# Patient Record
Sex: MALE | Race: BLACK OR AFRICAN AMERICAN
[De-identification: names, ages, dates, MRNs, and addresses within clinical notes are randomized per-mention and may not be internally consistent; named-entity substitution may affect disease eponyms.]

---

## 2018-04-29 ENCOUNTER — HOSPITAL ENCOUNTER (EMERGENCY)
Dept: HOSPITAL 15 - ER | Age: 58
Discharge: LEFT BEFORE DISCHARGE | End: 2018-04-29
Payer: COMMERCIAL

## 2018-04-29 VITALS — HEIGHT: 68 IN | WEIGHT: 150 LBS | BODY MASS INDEX: 22.73 KG/M2

## 2018-04-29 VITALS — SYSTOLIC BLOOD PRESSURE: 147 MMHG | DIASTOLIC BLOOD PRESSURE: 102 MMHG

## 2018-04-29 DIAGNOSIS — Z53.21: ICD-10-CM

## 2018-04-29 DIAGNOSIS — I10: Primary | ICD-10-CM

## 2018-04-29 PROCEDURE — 93005 ELECTROCARDIOGRAM TRACING: CPT

## 2025-02-05 ENCOUNTER — HOSPITAL ENCOUNTER (EMERGENCY)
Dept: HOSPITAL 15 - ER | Age: 65
Discharge: HOME | End: 2025-02-05
Payer: COMMERCIAL

## 2025-02-05 VITALS
DIASTOLIC BLOOD PRESSURE: 88 MMHG | RESPIRATION RATE: 18 BRPM | HEART RATE: 96 BPM | SYSTOLIC BLOOD PRESSURE: 136 MMHG | OXYGEN SATURATION: 100 %

## 2025-02-05 VITALS — HEIGHT: 68 IN | BODY MASS INDEX: 22.35 KG/M2 | WEIGHT: 147.49 LBS

## 2025-02-05 VITALS — TEMPERATURE: 98.4 F

## 2025-02-05 DIAGNOSIS — I16.1: Primary | ICD-10-CM

## 2025-02-05 DIAGNOSIS — Z88.8: ICD-10-CM

## 2025-02-05 LAB
ANION GAP SERPL CALCULATED.3IONS-SCNC: 6 MMOL/L (ref 5–15)
BUN SERPL-MCNC: 11 MG/DL (ref 9–23)
BUN/CREAT SERPL: 9.2 (ref 10–20)
CALCIUM SERPL-MCNC: 9.7 MG/DL (ref 8.7–10.4)
CHLORIDE SERPL-SCNC: 104 MMOL/L (ref 98–107)
CO2 SERPL-SCNC: 29 MMOL/L (ref 20–31)
GLUCOSE SERPL-MCNC: 96 MG/DL (ref 74–106)
HCT VFR BLD AUTO: 46.5 % (ref 41–53)
HGB BLD-MCNC: 15.6 G/DL (ref 13.5–17.5)
MCH RBC QN AUTO: 28.9 PG (ref 28–32)
MCV RBC AUTO: 86 FL (ref 80–100)
NRBC BLD QL AUTO: 0.1 %
POTASSIUM SERPL-SCNC: 4.4 MMOL/L (ref 3.5–5.1)
SODIUM SERPL-SCNC: 139 MMOL/L (ref 136–145)

## 2025-02-05 PROCEDURE — 36415 COLL VENOUS BLD VENIPUNCTURE: CPT

## 2025-02-05 PROCEDURE — 85025 COMPLETE CBC W/AUTO DIFF WBC: CPT

## 2025-02-05 PROCEDURE — 80048 BASIC METABOLIC PNL TOTAL CA: CPT

## 2025-02-05 NOTE — ED.PDOC
HPI Comments


64 y/o M, with PMHX of HTN prsents to the ED for CC of hypertension. Patient 

states, that he has been experiencing abnormally high blood pressure readings 

xdays. Patient comments on, easing himself off hypertension medication; non-

complaint with hypertension medication due to readings normalizing. Patient 

endorse on, eating foods high in sodium which he believes can be a result to his

symptoms but is unsure. Patient denies fever, headache, dizziness, chest pain, 

palpitations, or SOB.  No other symptoms or modifying factors at this time.


Time Seen by MD:  13:00


Primary Care Provider:  NICOLEK


Reviewed Notes:  Nurses Notes, Medications, Allergies


Allergies:  


Coded Allergies:  


     Lisinopril (Verified  Allergy, Unknown, ITCHING, 8/27/16)


Information Source:  Patient


Mode of Arrival:  Ambulatory


Severity:  Moderate


Timing:  Days


Duration:  Since onset


Prehospital treatment:  None


Onset:  At Rest


Cardiac Risk Factors:  HTN


PE Risk Factors:  None


History of:  None


Associated Signs and Symptoms:  None





Past Medical History


PAST MEDICAL HISTORY:  Cancer, HTN


Surgical History:  Denies all surgeries





Family History


Family History:  Unknown





Social History


Smoker:  Non-Smoker


Alcohol:  Rarely


Drugs:  Denies Drug Use


Lives In:  Home





Constitutional:  denies: chills, diaphoresis, fatigue, fever, malaise, sweats, 

weakness, others


EENTM:  denies: blurred vision, double vision, ear bleeding, ear discharge, ear 

drainage, ear pain, ear ringing, eye pain, eye redness, hearing loss, mouth 

pain, mouth swelling, nasal discharge, nose bleeding, nose congestion, nose 

pain, photophobia, tearing, throat pain, throat swelling, voice changes, others


Respiratory:  denies: cough, hemoptysis, orthopnea, SOB at rest, shortness of 

breath, SOB with excertion, stridor, wheezing, others


Cardiovascular:  denies: chest pain, dizzy spells, diaphoresis, Dyspnea on 

exertion, edema, irregular heart beat, left arm pain, lightheadedness, 

palpitations, PND, syncope, others


Gastrointestinal:  denies: abdomen distended, abdominal pain, blood streaked 

bowels, constipated, diarrhea, dysphagia, difficulty swallowing, hematemesis, 

melena, nausea, poor appetite, poor fluid intake, rectal bleeding, rectal pain, 

vomiting, others


Genitourinary:  denies: burning, dysuria, flank pain, frequency, hematuria, 

incontinence, penile discharge, penile sore, pain, testicle pain, testicle 

swelling, urgency, others


Neurological:  reports: headache (burning); denies: dizziness, fainting, left 

sided numbness, left sided weakness, numbness, paresthesia, pre-existing 

deficit, right sided numbness, right sided weakness, seizure, speech problems, 

tingling, tremors, weakness, others


Musculoskeletal:  denies: back pain, gout, joint pain, joint swelling, muscle p

ain, muscle stiffness, neck pain, others


Integumetry:  denies: bruises, change in color, change in hair/nails, dryness, 

laceration, lesions, lumps, rash, wounds, others


Allergic/Immunocompromised:  denies: Difficulty Healing, Frequent Infections, 

Hives, Itching, others


Hematologic/Lymphatic:  denies: anemia, blood clots, easy bleeding, easy 

bruising, swollen glands, others


Endocrine:  denies: excessive hunger, excessive sweating, excessive thirst, 

excessive urination, flushing, intolerance to cold, intolerance to heat, 

unexplained weight gain, unexplained weight loss, others


Psychiatric:  denies: anxiety, bipolar disorder, depression, hopeless, panic 

disorder, schizophrenia, sleepless, suicidal, others


All Other Systems:  Reviewed and Negative





Physical Exam


General Appearance:  Moderate Distress


HEENT:  Normal ENT Inspection, Pharynx Normal, TMs Normal


Neck:  Full Range of Motion, Non-Tender, Normal, Normal Inspection


Respiratory:  Chest Non-Tender, Lungs Clear, No Accessory Muscle Use, No 

Respiratory Distress, Normal Breath Sounds


Cardiovascular:  No Edema, No JVD, No Murmur, No Gallop, Normal Peripheral 

Pulses, Regular Rate/Rhythm


Breast Exam:  Deferred


Gastrointestinal:  No Organomegaly, Non Tender, No Pulsatile Mass, Normal Bowel 

Sounds, Soft


Genitalia:  Deferred


Pelvic:  Deferred


Rectal:  Deferred


Extremities:  No calf tenderness, Normal capillary refill, Normal inspection, 

Normal range of motion, Non-tender, No pedal edema


Musculoskeletal :  


   Apperance:  Normal


Neurologic:  Alert, CNs II-XII nml as Tested, No Motor Deficits, Normal Affect, 

Normal Mood, No Sensory Deficits


Cerebellar Function:  Normal


Reflexes:  Normal


Skin:  Dry, Normal Color, Warm


Peripheral Pulses:  3+ Radial (R), 3+ Radial (L)


Lymphatic:  No Adenopathy





Was a procedure done?


Was a procedure done?:  No





CP Differential Dx


Differential Diagnosis:  A-fib, A-Flutter, Angina, Anxiety / Panic Attack, 

Atrial Dysrhythmia, Electrolyte Disorder, Other (hypertensive crisis)





X-Ray, Labs, Meds, VS





                                   Vital Signs








  Date Time  Temp Pulse Resp B/P (MAP) Pulse Ox O2 Delivery O2 Flow Rate FiO2


 


2/5/25 16:33    180/102    


 


2/5/25 16:13 98.4 98 16 180/102 (128) 99   





 98.4       


 


2/5/25 13:45    179/100    


 


2/5/25 13:10 96.9 71 16 183/101 (128) 97   





    161/109 (126)    








                                       Lab








Test


 2/5/25


13:25 Range/Units


 


 


White Blood Count


 5.6 


 4.4-10.8


10^3/uL


 


Red Blood Count


 5.40 


 4.5-5.90


10^6/uL


 


Hemoglobin 15.6  13.5-17.5  g/dL


 


Hematocrit 46.5  41.0-53.0  %


 


Mean Corpuscular Volume 86.0  80.0-100.0  fL


 


Mean Corpuscular Hemoglobin 28.9  28.0-32.0  pg


 


Mean Corpuscular Hemoglobin


Concent 33.6 


 32.0-36.0  g/dL





 


Red Cell Distribution Width 13.7  11.8-14.3  %


 


Platelet Count


 282 


 140-450


10^3/uL


 


Mean Platelet Volume 7.8  6.9-10.8  fL


 


Neutrophils (%) (Auto) 65.6  37.0-80.0  %


 


Lymphocytes (%) (Auto) 20.6  10.0-50.0  %


 


Monocytes (%) (Auto) 9.6  0.0-12.0  %


 


Eosinophils (%) (Auto) 3.4  0.0-7.0  %


 


Basophils (%) (Auto) 0.8  0.0-2.0  %


 


Neutrophils # (Auto)


 3.6 


 1.6-8.6  10


^3/uL


 


Lymphocytes # (Auto)


 1.1 


 0.4-5.4  10


^3/uL


 


Monocytes # (Auto) 0.5  0-1.3  10 ^3/uL


 


Eosinophils # (Auto) 0.2  0-0.8  10 ^3/uL


 


Basophils # (Auto) 0  0-0.2  10 ^3/uL


 


Nucleated Red Blood Cells 0.1   %


 


Sodium Level 139  136-145  mmol/L


 


Potassium Level 4.4  3.5-5.1  mmol/L


 


Chloride Level 104    mmol/L


 


Carbon Dioxide Level 29  20-31  mmol/L


 


Anion Gap 6  5-15  


 


Blood Urea Nitrogen 11  9-23  mg/dL


 


Creatinine


 1.20 


 0.700-1.30


mg/dL


 


Glomerular Filtration Rate


Calc 66 


 >90  mL/min





 


BUN/Creatinine Ratio 9.2 L 10.0-20.0  


 


Serum Glucose 96    mg/dL


 


Calcium Level 9.7  8.7-10.4  mg/dL








                               Current Medications








 Medications


  (Trade)  Dose


 Ordered  Sig/Renzo


 Route  Start Time


 Stop Time Status Last Admin


 


 Amlodipine


 Besylate


  (Norvasc Tablet)  10 mg  ONCE  ONCE


 PO  2/5/25 13:15


 2/5/25 13:16 DC 2/5/25 13:45





 


 Clonidine HCl


  (Catapres Tablet)  0.2 mg  ONCE  ONCE


 PO  2/5/25 16:15


 2/5/25 16:16 DC 2/5/25 16:33








Patient alert.  


States that his blood pressure has been high.


Saturation pristine on room air.


Answering all questions.


Denies headache.


Denies dizziness. 


Was given Norvasc.  


Explained to the patient.


Blood pressure better after giving Norvasc clonidine.  


Patient insists on going home.  


WBC within normal limits.  


Hemoglobin within normal limits.  


Explained to the patient that he will need to take his medication.  


Was told to follow up with his primary care physician.  


Was told to come back if there is any problem.


Time of 1ST Reevaluation:  13:30


Reevaluation 1ST:  Improved


Time of 2ND Reevaluation:  16:38


Reevaluation 2ND:  Improved


Patient Education/Counseling:  Diagnosis, Treatment


Family Education/Counseling:  Diagnosis, Treatment


Additional Information


I reviewed the following notes from patient's past medical encounters: 04/29/25 

DX: HYPERTENSION





The following tests were ordered, and results were reviewed by me: CBC, BMP





Additional Information was gathered from interviewing the following independent 

historians: FAMILY





I discussed treatment and results with medical personnel and: PATIENT AND FAMILY





Departure 1


Departure


Time of Disposition:  13:25


Impression:  


   Primary Impression:  


   Hypertensive emergency


Disposition:  01 HOME / SELF CARE / HOMELESS


Condition:  Good


Discharged With:  Self





Critical Care Note


Critical Care Time?:  No





Stability


Stability form required:  No





Heart Score


Heart Score:  








Heart Score Response (Comments) Value


 


History N/A 0


 


EKG N/A 0


 


Age N/A 0


 


Risk Factors N/A 0


 


Troponin N/A 0


 


Total  0














I personally scribed for NIKO MEYER MD ([email or web address]) on 2/5/25 at 13:18.  

Electronically submitted by Emily Reyes ([email or web address]).


I personally scribed for NIKO MEYER MD ([email or web address]) on 2/5/25 at 14:57.  

Electronically submitted by Emily Reyes ([email or web address]).





NIKO MEYER MD            Feb 5, 2025 13:18